# Patient Record
Sex: FEMALE | Race: BLACK OR AFRICAN AMERICAN | Employment: OTHER | URBAN - METROPOLITAN AREA
[De-identification: names, ages, dates, MRNs, and addresses within clinical notes are randomized per-mention and may not be internally consistent; named-entity substitution may affect disease eponyms.]

---

## 2017-03-21 ENCOUNTER — HOSPITAL ENCOUNTER (OUTPATIENT)
Dept: LAB | Age: 72
Discharge: HOME | End: 2017-03-21
Payer: MEDICARE

## 2017-03-21 PROCEDURE — 87070 CULTURE OTHR SPECIMN AEROBIC: CPT | Performed by: OPHTHALMOLOGY

## 2017-03-21 PROCEDURE — 87075 CULTR BACTERIA EXCEPT BLOOD: CPT | Performed by: OPHTHALMOLOGY

## 2017-03-24 LAB
BACTERIA SPEC CULT: NORMAL
SERVICE CMNT-IMP: NORMAL

## 2017-03-26 LAB
BACTERIA SPEC CULT: NORMAL
SERVICE CMNT-IMP: NORMAL

## 2025-04-25 ENCOUNTER — CLINICAL DOCUMENTATION (OUTPATIENT)
Age: 80
End: 2025-04-25

## 2025-04-25 NOTE — PROGRESS NOTES
Attempted to contact patient several times to schedule from referral received from [facility] office. each time patient picked up phone and did not say anything. Final time I called patient stated she was \"sick of these phone calls\", and hung up.

## 2025-04-28 ENCOUNTER — TELEPHONE (OUTPATIENT)
Age: 80
End: 2025-04-28

## 2025-04-28 NOTE — TELEPHONE ENCOUNTER
Dr. Murry's office faxed over referral/orders for the pt to get echo, Holter, and nuc stress test scheduled.  Orders need to be put in for tests ASAP

## 2025-04-30 ENCOUNTER — CLINICAL SUPPORT (OUTPATIENT)
Age: 80
End: 2025-04-30

## 2025-04-30 DIAGNOSIS — R06.00 DYSPNEA, UNSPECIFIED TYPE: ICD-10-CM

## 2025-04-30 DIAGNOSIS — I49.9 CARDIAC ARRHYTHMIA, UNSPECIFIED CARDIAC ARRHYTHMIA TYPE: Primary | ICD-10-CM

## 2025-04-30 NOTE — PROGRESS NOTES
@patient@  was seen in our office today for a 10 day cardiac monitor placement.          Plan:   Cardiac monitor was applied with patient consent. [unfilled]was given verbal and written ( located in box) instructions on placement and how to document symptoms. Advised to wear monitor for 10 days continuously.     Informed patient of key points while monitoring:  Charge phone every night.  Remember to change the patch every 7 days ( if you are ordered to wear patch longer than a week) ----Take patch off and cut it in half using scissors and throw it in the trash.  Keep phone within 10 feet of you at all times. The vital patch will store up to 16 hours of data if you forget your phone at home. Any data over 16 hours away from your phone may be lost and not able to be downloaded once you are back in range of phone.    You may shower with the patch on with back to the water and patch dry. Do not Swim with patch on or submerge in water. It is water resistant but not waterproof.    Continue daily activities as normal. Instructed patient on how to record symptoms when they arise.   Check your phone twice daily for any alerts from [employer].     Returning VitalPatch Monitoring Kit  Advised  patient to return the monitor on the _11 th day.  Place  and cable in any slot. Turn phone off and place in the right side slot facing down.   Place unused patched in the left slot.   Peal off adhesive liner on flap and seal box.   The box has a prepaid shipping label already attached. Drop off at your local post office or mail directly from home if your able to fit it in your mailbox.      Call [employer] at [phone] with any questions or concerns regarding connection (billing if applicable) or you can reach them at [email or web address]    **Cost of lost or unreturned device approximate $300**    Pt verbalized understanding of above.

## 2025-04-30 NOTE — PATIENT INSTRUCTIONS
Returning VitalPatch Monitoring Kit  Advised  patient to return the monitor on the 11 th day.  Place  and cable in any slot. Turn phone off and place in the right side slot facing down.   Place unused patched in the left slot.   Peal off adhesive liner on flap and seal box.   The box has a prepaid shipping label already attached. Drop off at your local post office or mail directly from home if your able to fit it in your mailbox.      Call [employer] at [phone] with any questions or concerns regarding connection (billing if applicable) or you can reach them at [email or web address]    **Cost of lost or unreturned device approximate $300**

## 2025-04-30 NOTE — PROGRESS NOTES
Fay Leone  was seen in our office today for a 10 day cardiac monitor placement.          Plan:   Cardiac monitor was applied with patient consent. Fay Leone was given verbal and written ( located in box) instructions on placement and how to document symptoms. Advised to wear monitor for 10 days continuously.     Informed patient of key points while monitoring:  Charge phone every night.  Remember to change the patch every 7 days ( if you are ordered to wear patch longer than a week) ----Take patch off and cut it in half using scissors and throw it in the trash.  Keep phone within 10 feet of you at all times. The vital patch will store up to 16 hours of data if you forget your phone at home. Any data over 16 hours away from your phone may be lost and not able to be downloaded once you are back in range of phone.    You may shower with the patch on with back to the water and patch dry. Do not Swim with patch on or submerge in water. It is water resistant but not waterproof.    Continue daily activities as normal. Instructed patient on how to record symptoms when they arise.   Check your phone twice daily for any alerts from [employer].     Returning VitalPatch Monitoring Kit  Advised  patient to return the monitor on the 11th day.  Place  and cable in any slot. Turn phone off and place in the right side slot facing down.   Place unused patched in the left slot.   Peal off adhesive liner on flap and seal box.   The box has a prepaid shipping label already attached. Drop off at your local post office or mail directly from home if your able to fit it in your mailbox.      Call [employer] at [phone] with any questions or concerns regarding connection (billing if applicable) or you can reach them at [email or web address]    **Cost of lost or unreturned device approximate $300**    Pt verbalized understanding of above.

## 2025-05-08 ENCOUNTER — TELEPHONE (OUTPATIENT)
Age: 80
End: 2025-05-08